# Patient Record
Sex: MALE | Race: WHITE | URBAN - METROPOLITAN AREA
[De-identification: names, ages, dates, MRNs, and addresses within clinical notes are randomized per-mention and may not be internally consistent; named-entity substitution may affect disease eponyms.]

---

## 2020-09-03 ENCOUNTER — APPOINTMENT (RX ONLY)
Dept: URBAN - METROPOLITAN AREA CLINIC 259 | Facility: CLINIC | Age: 50
Setting detail: DERMATOLOGY
End: 2020-09-03

## 2020-09-03 VITALS — HEIGHT: 76 IN | RESPIRATION RATE: 18 BRPM | WEIGHT: 300 LBS

## 2020-09-03 DIAGNOSIS — R21 RASH AND OTHER NONSPECIFIC SKIN ERUPTION: ICD-10-CM

## 2020-09-03 PROCEDURE — ? ADDITIONAL NOTES

## 2020-09-03 PROCEDURE — 99202 OFFICE O/P NEW SF 15 MIN: CPT

## 2020-09-03 PROCEDURE — ? COUNSELING

## 2020-09-03 ASSESSMENT — LOCATION ZONE DERM
LOCATION ZONE: TRUNK
LOCATION ZONE: LEG
LOCATION ZONE: ARM

## 2020-09-03 ASSESSMENT — LOCATION SIMPLE DESCRIPTION DERM
LOCATION SIMPLE: LEFT THIGH
LOCATION SIMPLE: RIGHT ELBOW
LOCATION SIMPLE: ABDOMEN

## 2020-09-03 ASSESSMENT — LOCATION DETAILED DESCRIPTION DERM
LOCATION DETAILED: RIGHT LATERAL ELBOW
LOCATION DETAILED: PERIUMBILICAL SKIN
LOCATION DETAILED: LEFT ANTERIOR LATERAL PROXIMAL THIGH

## 2021-10-13 NOTE — PROCEDURE: ADDITIONAL NOTES
Called parent to review lab results no answer left VM to call back
Additional Notes: To obtain bx report from previous derm Dr Del Valle instead of doing bx today \\nBased on results will discuss tx plan, pt wants painful lesions on abdomen removed surgically
Detail Level: Simple